# Patient Record
Sex: MALE | Race: WHITE | NOT HISPANIC OR LATINO | Employment: FULL TIME | URBAN - METROPOLITAN AREA
[De-identification: names, ages, dates, MRNs, and addresses within clinical notes are randomized per-mention and may not be internally consistent; named-entity substitution may affect disease eponyms.]

---

## 2021-08-12 ENCOUNTER — OFFICE VISIT (OUTPATIENT)
Dept: INTERNAL MEDICINE | Facility: CLINIC | Age: 26
End: 2021-08-12
Payer: COMMERCIAL

## 2021-08-12 ENCOUNTER — LAB VISIT (OUTPATIENT)
Dept: INTERNAL MEDICINE | Facility: CLINIC | Age: 26
End: 2021-08-12
Payer: COMMERCIAL

## 2021-08-12 VITALS
SYSTOLIC BLOOD PRESSURE: 118 MMHG | HEART RATE: 68 BPM | WEIGHT: 160.38 LBS | TEMPERATURE: 98 F | DIASTOLIC BLOOD PRESSURE: 74 MMHG

## 2021-08-12 DIAGNOSIS — F41.9 ANXIETY: Primary | ICD-10-CM

## 2021-08-12 DIAGNOSIS — Z20.822 EXPOSURE TO COVID-19 VIRUS: ICD-10-CM

## 2021-08-12 DIAGNOSIS — R55 SYNCOPE, VASOVAGAL: ICD-10-CM

## 2021-08-12 PROCEDURE — 99203 PR OFFICE/OUTPT VISIT, NEW, LEVL III, 30-44 MIN: ICD-10-PCS | Mod: S$GLB,,, | Performed by: INTERNAL MEDICINE

## 2021-08-12 PROCEDURE — 3078F DIAST BP <80 MM HG: CPT | Mod: CPTII,S$GLB,, | Performed by: INTERNAL MEDICINE

## 2021-08-12 PROCEDURE — 1126F PR PAIN SEVERITY QUANTIFIED, NO PAIN PRESENT: ICD-10-PCS | Mod: CPTII,S$GLB,, | Performed by: INTERNAL MEDICINE

## 2021-08-12 PROCEDURE — 3074F PR MOST RECENT SYSTOLIC BLOOD PRESSURE < 130 MM HG: ICD-10-PCS | Mod: CPTII,S$GLB,, | Performed by: INTERNAL MEDICINE

## 2021-08-12 PROCEDURE — U0005 INFEC AGEN DETEC AMPLI PROBE: HCPCS | Performed by: INTERNAL MEDICINE

## 2021-08-12 PROCEDURE — 3074F SYST BP LT 130 MM HG: CPT | Mod: CPTII,S$GLB,, | Performed by: INTERNAL MEDICINE

## 2021-08-12 PROCEDURE — 1160F PR REVIEW ALL MEDS BY PRESCRIBER/CLIN PHARMACIST DOCUMENTED: ICD-10-PCS | Mod: CPTII,S$GLB,, | Performed by: INTERNAL MEDICINE

## 2021-08-12 PROCEDURE — 3078F PR MOST RECENT DIASTOLIC BLOOD PRESSURE < 80 MM HG: ICD-10-PCS | Mod: CPTII,S$GLB,, | Performed by: INTERNAL MEDICINE

## 2021-08-12 PROCEDURE — 1159F PR MEDICATION LIST DOCUMENTED IN MEDICAL RECORD: ICD-10-PCS | Mod: CPTII,S$GLB,, | Performed by: INTERNAL MEDICINE

## 2021-08-12 PROCEDURE — 1159F MED LIST DOCD IN RCRD: CPT | Mod: CPTII,S$GLB,, | Performed by: INTERNAL MEDICINE

## 2021-08-12 PROCEDURE — 99999 PR PBB SHADOW E&M-NEW PATIENT-LVL III: CPT | Mod: PBBFAC,,, | Performed by: INTERNAL MEDICINE

## 2021-08-12 PROCEDURE — U0003 INFECTIOUS AGENT DETECTION BY NUCLEIC ACID (DNA OR RNA); SEVERE ACUTE RESPIRATORY SYNDROME CORONAVIRUS 2 (SARS-COV-2) (CORONAVIRUS DISEASE [COVID-19]), AMPLIFIED PROBE TECHNIQUE, MAKING USE OF HIGH THROUGHPUT TECHNOLOGIES AS DESCRIBED BY CMS-2020-01-R: HCPCS | Performed by: INTERNAL MEDICINE

## 2021-08-12 PROCEDURE — 99999 PR PBB SHADOW E&M-NEW PATIENT-LVL III: ICD-10-PCS | Mod: PBBFAC,,, | Performed by: INTERNAL MEDICINE

## 2021-08-12 PROCEDURE — 1126F AMNT PAIN NOTED NONE PRSNT: CPT | Mod: CPTII,S$GLB,, | Performed by: INTERNAL MEDICINE

## 2021-08-12 PROCEDURE — 99203 OFFICE O/P NEW LOW 30 MIN: CPT | Mod: S$GLB,,, | Performed by: INTERNAL MEDICINE

## 2021-08-12 PROCEDURE — 1160F RVW MEDS BY RX/DR IN RCRD: CPT | Mod: CPTII,S$GLB,, | Performed by: INTERNAL MEDICINE

## 2021-08-12 RX ORDER — LEVOCETIRIZINE DIHYDROCHLORIDE 5 MG/1
5 TABLET, FILM COATED ORAL NIGHTLY
COMMUNITY

## 2021-08-13 ENCOUNTER — PATIENT MESSAGE (OUTPATIENT)
Dept: INTERNAL MEDICINE | Facility: CLINIC | Age: 26
End: 2021-08-13

## 2021-08-13 LAB
SARS-COV-2 RNA RESP QL NAA+PROBE: DETECTED
SARS-COV-2- CYCLE NUMBER: 17.43

## 2021-12-28 ENCOUNTER — PATIENT MESSAGE (OUTPATIENT)
Dept: INTERNAL MEDICINE | Facility: CLINIC | Age: 26
End: 2021-12-28
Payer: COMMERCIAL

## 2021-12-28 ENCOUNTER — PATIENT MESSAGE (OUTPATIENT)
Dept: ADMINISTRATIVE | Facility: OTHER | Age: 26
End: 2021-12-28
Payer: COMMERCIAL

## 2022-03-22 ENCOUNTER — CLINICAL SUPPORT (OUTPATIENT)
Dept: REHABILITATION | Facility: HOSPITAL | Age: 27
End: 2022-03-22
Payer: COMMERCIAL

## 2022-03-22 DIAGNOSIS — G89.29 CHRONIC BILATERAL LOW BACK PAIN WITH LEFT-SIDED SCIATICA: ICD-10-CM

## 2022-03-22 DIAGNOSIS — M53.2X6 LUMBAR SPINE INSTABILITY: ICD-10-CM

## 2022-03-22 DIAGNOSIS — M54.42 CHRONIC BILATERAL LOW BACK PAIN WITH LEFT-SIDED SCIATICA: ICD-10-CM

## 2022-03-22 PROBLEM — M54.9 BACK PAIN: Status: ACTIVE | Noted: 2022-03-22

## 2022-03-22 PROCEDURE — 97161 PT EVAL LOW COMPLEX 20 MIN: CPT | Mod: PN | Performed by: PHYSICAL THERAPIST

## 2022-03-22 PROCEDURE — 97110 THERAPEUTIC EXERCISES: CPT | Mod: PN | Performed by: PHYSICAL THERAPIST

## 2022-03-22 NOTE — PLAN OF CARE
[facility]   Physical Therapy Initial Evaluation     Date: 3/22/2022   Name: Julio Gee   Clinic Number: [id number]    Therapy Diagnosis:   Encounter Diagnoses   Name Primary?    Chronic bilateral low back pain with left-sided sciatica Yes    Lumbar spine instability      Referring Provider: Karson Long Jr. MD    Referring Provider Orders: PT eval and treat  Medical Diagnosis from Referral: Lumbar spondylitis [M46.96]  Evaluation Date: 3/22/2022  Authorization Period Expiration: 5/1/2022  Plan of Care Expiration: 6/22/2022  Progress Note Due: 5/22/2022  Visit # / Visits authorized: 1/pending   FOTO: 1/3 (3/22/2022)    Precautions: Standard     Time In: 14:00  Time Out: 14:45  Total Appointment Time (timed & untimed codes): 45 minutes    SUBJECTIVE     Date of onset: in the last 1-2 years  History of current condition - Justus reports insidious-onset back pain after lifting incorrectly and playing football when younger. He also reports L-sided posterior leg pain (tingly, pressure) that bothers him with prolonged sitting. Symptoms are better today than usual.    Falls: 0  Imaging: MRI reveals lower lumbar bulging discs    Prior Therapy: physical therapy in middle school - Sever's disease  Social History: lives in an apartment with stairs, with spouse  Occupation: contractor (some prolonged sitting/driving, some on-site work)  Prior Level of Function: no deficits  Current Level of Function: significantly reduced sitting tolerance    Pain:  Current: 2/10, worst: 6/10, best: 0/10   Location: central lower back, L posterior leg  Description: aching  Aggravating Factors: sitting (no more than 15 minutes)  Easing Factors: changing positions    Patients goals: reduce pain, be able to sit for longer periods of time     Medical History:   No past medical history on file.    Surgical History:   Julio Jerome Gerard Briscoe has a past surgical history that includes Hernia repair  (1997).    Medications:   Julio has a current medication list which includes the following prescription(s): levocetirizine.    Allergies:   Review of patient's allergies indicates:   Allergen Reactions    Ceftin [cefuroxime axetil]           OBJECTIVE     Posture: unremarkable  Palpation: no tenderness to palpation of lumbar paraspinals, quadratus lumborum or posterior hip muscles  Pelvic Girdle Stability: Pt demonstrates moderately impaired ability to stabilize pelvis with Active Straight Leg Raise Test. Able to improve markedly with verbal/manual cues for transverse abdominus activation.  Lumbar range of motion within normal limits and pain-free. Gross lumbar joint hypomobility.  No pain with central PA mobilizations to lumbar spine. Discomfort with repeated extension.    Special Testing  Lumbar Traction Test negative   Straight Leg Raise Test positive   Slump Test negative   Active Straight Leg Raise Test positive       Hip Strength 3/22/22   R hip flexion 5/5   R hip external rotation 4/5   L hip flexion 5/5   L hip external rotation 5/5     Hip Range of Motion 3/22/22   R internal rotation 30°    L internal rotation 30°       Limitation/Restriction for FOTO Lumbar Spine Survey    Not performed today due to schedule constraints, but pt will perform at follow-up session.       TREATMENT     Total Treatment time (time-based codes) separate from Evaluation: 10 minutes   Justus received the treatments listed below:      Therapeutic exercises to develop strength, endurance and core stabilization for 10 minutes - HEP-building: transverse abdominis brace, straight leg raise, bridging, active hamstring stretch/sciatic nerve slides    PATIENT EDUCATION AND HOME EXERCISES     Education provided: lumbar spine anatomy, procedure/purpose/risks/benefits of dry needling, anatomy & function of lumbar multifidi, etiology of sciatica, lumbar roll for supported lumbar spine position with sitting, directional-preference for lower  back pain    Written Home Exercises Provided: Yes. Exercises were reviewed, and Justus was able to demonstrate them prior to the end of the session.  Justus demonstrated good  understanding of the education provided. See EMR under Patient Instructions for exercises provided during therapy sessions.    ASSESSMENT     Julio is a 26 y.o. male referred to outpatient Physical Therapy with a medical diagnosis of lumbar spondylitis. Patient presents with deficits to pelvic girdle stability, functional core strength, and lumbar joint hypomobility. Symptoms inhibit his ability to tolerate prolonged sitting and work tasks    Patient prognosis is Good.   Patient will benefit from skilled outpatient Physical Therapy to address the deficits stated above and in the chart below, provide patient /family education, and to maximize patient's level of independence.     Plan of care discussed with patient: yes  Patient's spiritual, cultural and educational needs considered and patient is agreeable to the plan of care and goals as stated below:     Anticipated Barriers for therapy: none    Medical Necessity is demonstrated by the following  History  Co-morbidities and personal factors that may impact the plan of care Co-morbidities:   History of hernia repair, Sever's disease    Personal Factors:   no deficits     low   Examination  Body Structures and Functions, activity limitations and participation restrictions that may impact the plan of care Body Regions:   back  lower extremities    Body Systems:    Joint mobility, functional core strength, lumbopelvic stability    Participation Restrictions:   Prolonged sitting, work tasks    Activity limitations:   Learning and applying knowledge  solving problems    General Tasks and Commands  no deficits    Communication  no deficits    Mobility  lifting and carrying objects  using transportation (bus, train, plane, car)    Self care  no deficits    Domestic Life  no  deficits    Interactions/Relationships  no deficits    Life Areas  employment    Community and Social Life  no deficits         low   Clinical Presentation stable and uncomplicated low   Decision Making/ Complexity Score: low       GOALS  Short Term Goals (4 weeks)  Pt will report pain of no more than 3/10 in the lower back with regular activity  Pt will be able to sit for at least 30 minutes before significant discomfort in the back/L leg  Pt will be independent with introductory HEP    Long Term Goals (8 weeks)  Pt will report pain of no more than 1/10 in the lower back with regular activity  Pt will demonstrate bilateral hip ROM WFL for improved positional tolerance and functional mobility  Pt will be able to sit for at least an hour minutes before significant discomfort in the back/L leg  Pt will be independent with comprehensive HEP      PLAN     Plan of Care Certification: 3/22/2022 to 6/22/2022    Outpatient Physical Therapy: 2 times weekly for 12 weeks to include the following interventions - Therapeutic Exercise, Manual Therapy, Therapeutic Activity, Neuromuscular Re-education, Electrical Stimulation (Unattended)    Nallely Carbajal, PT, DPT          I CERTIFY THE NEED FOR THESE SERVICES FURNISHED UNDER THIS PLAN OF TREATMENT AND WHILE UNDER MY CARE   Physician's comments:     Physician's Signature: ___________________________________________________

## 2022-03-22 NOTE — PATIENT INSTRUCTIONS
Straight Leg Raise, 2 sets of 10  - Inhale to prepare  - As you exhale, gently engage the transverse abdominis by drawing the belly button down to the spine  - Holding the hips level and the belly button down, gently lift one leg a few inches  - Inhale again to rest  *Don't hold your breath     Bridging, 2 sets of 10  - Inhale to prepare  - As you exhale, gently engage the transverse abdominis by drawing the belly button down to the spine  - Holding the belly button in, lift the hips (only lift as high as it is comfortable) and lower  - Inhale again to rest  *Don't hold your breath

## 2022-05-26 ENCOUNTER — DOCUMENTATION ONLY (OUTPATIENT)
Dept: REHABILITATION | Facility: HOSPITAL | Age: 27
End: 2022-05-26
Payer: COMMERCIAL

## 2022-05-26 DIAGNOSIS — M53.2X6 LUMBAR SPINE INSTABILITY: ICD-10-CM

## 2022-05-26 DIAGNOSIS — M54.9 BACK PAIN, UNSPECIFIED BACK LOCATION, UNSPECIFIED BACK PAIN LATERALITY, UNSPECIFIED CHRONICITY: Primary | ICD-10-CM

## 2022-05-26 NOTE — PROGRESS NOTES
[facility]   Physical Therapy Discharge Summary    Date: 5/26/2022   Name: Julio Gee Jr  Clinic Number: [id number]    Therapy Diagnosis:   Encounter Diagnoses   Name Primary?    Back pain, unspecified back location, unspecified back pain laterality, unspecified chronicity Yes    Lumbar spine instability      Referring Provider: Karson Long Jr. MD      Initial Visit: 3/22/22  Date of Last Visit: 3/22/22  Total Visits Received: 1  Missed Visits: 3      Assessment      Julio Gee Jr did not return to therapy after his initial evaluation. No discernable progress towards goals made at initial evaluation.    Discharge reason: Patient self discharge and Pt has not re-scheduled further follow-up sessions  Discharge plan: Continue HEP and Pt to follow-up with MD as planned    Plan     This patient is discharged from Physical Therapy Services.       Nallely Carbajal, PT, DPT